# Patient Record
Sex: MALE | Race: AMERICAN INDIAN OR ALASKA NATIVE
[De-identification: names, ages, dates, MRNs, and addresses within clinical notes are randomized per-mention and may not be internally consistent; named-entity substitution may affect disease eponyms.]

---

## 2020-02-20 ENCOUNTER — HOSPITAL ENCOUNTER (OUTPATIENT)
Dept: HOSPITAL 5 - ED | Age: 20
Setting detail: OBSERVATION
LOS: 1 days | Discharge: HOME | End: 2020-02-21
Attending: INTERNAL MEDICINE | Admitting: INTERNAL MEDICINE
Payer: MEDICAID

## 2020-02-20 DIAGNOSIS — Z71.6: ICD-10-CM

## 2020-02-20 DIAGNOSIS — J45.909: ICD-10-CM

## 2020-02-20 DIAGNOSIS — I44.7: Primary | ICD-10-CM

## 2020-02-20 DIAGNOSIS — F17.200: ICD-10-CM

## 2020-02-20 DIAGNOSIS — R07.89: ICD-10-CM

## 2020-02-20 DIAGNOSIS — Z79.899: ICD-10-CM

## 2020-02-20 DIAGNOSIS — Z79.51: ICD-10-CM

## 2020-02-20 LAB
ALBUMIN SERPL-MCNC: 4.7 G/DL (ref 3.9–5)
ALT SERPL-CCNC: 16 UNITS/L (ref 7–56)
APTT BLD: 31.5 SEC. (ref 24.2–36.6)
BENZODIAZEPINES SCREEN,URINE: (no result)
BUN SERPL-MCNC: 10 MG/DL (ref 9–20)
BUN/CREAT SERPL: 10 %
CALCIUM SERPL-MCNC: 9.9 MG/DL (ref 8.4–10.2)
HCT VFR BLD CALC: 40.9 % (ref 35.5–45.6)
HEMOLYSIS INDEX: 7
HGB BLD-MCNC: 14.4 GM/DL (ref 11.8–15.2)
INR PPP: 1.11 (ref 0.87–1.13)
MCHC RBC AUTO-ENTMCNC: 35 % (ref 32–34)
MCV RBC AUTO: 84 FL (ref 84–94)
METHADONE SCREEN,URINE: (no result)
OPIATE SCREEN,URINE: (no result)
PLATELET # BLD: 273 K/MM3 (ref 140–440)
RBC # BLD AUTO: 4.88 M/MM3 (ref 3.65–5.03)

## 2020-02-20 PROCEDURE — 85007 BL SMEAR W/DIFF WBC COUNT: CPT

## 2020-02-20 PROCEDURE — 83880 ASSAY OF NATRIURETIC PEPTIDE: CPT

## 2020-02-20 PROCEDURE — 36415 COLL VENOUS BLD VENIPUNCTURE: CPT

## 2020-02-20 PROCEDURE — 99406 BEHAV CHNG SMOKING 3-10 MIN: CPT

## 2020-02-20 PROCEDURE — 84484 ASSAY OF TROPONIN QUANT: CPT

## 2020-02-20 PROCEDURE — A9502 TC99M TETROFOSMIN: HCPCS

## 2020-02-20 PROCEDURE — 93005 ELECTROCARDIOGRAM TRACING: CPT

## 2020-02-20 PROCEDURE — 85379 FIBRIN DEGRADATION QUANT: CPT

## 2020-02-20 PROCEDURE — 85610 PROTHROMBIN TIME: CPT

## 2020-02-20 PROCEDURE — 85730 THROMBOPLASTIN TIME PARTIAL: CPT

## 2020-02-20 PROCEDURE — G0378 HOSPITAL OBSERVATION PER HR: HCPCS

## 2020-02-20 PROCEDURE — 99291 CRITICAL CARE FIRST HOUR: CPT

## 2020-02-20 PROCEDURE — 71046 X-RAY EXAM CHEST 2 VIEWS: CPT

## 2020-02-20 PROCEDURE — 93017 CV STRESS TEST TRACING ONLY: CPT

## 2020-02-20 PROCEDURE — 80053 COMPREHEN METABOLIC PANEL: CPT

## 2020-02-20 PROCEDURE — 85025 COMPLETE CBC W/AUTO DIFF WBC: CPT

## 2020-02-20 PROCEDURE — 78452 HT MUSCLE IMAGE SPECT MULT: CPT

## 2020-02-20 PROCEDURE — 93306 TTE W/DOPPLER COMPLETE: CPT

## 2020-02-20 PROCEDURE — 80307 DRUG TEST PRSMV CHEM ANLYZR: CPT

## 2020-02-20 PROCEDURE — 80048 BASIC METABOLIC PNL TOTAL CA: CPT

## 2020-02-20 PROCEDURE — 93010 ELECTROCARDIOGRAM REPORT: CPT

## 2020-02-20 NOTE — XRAY REPORT
CHEST 2 VIEWS 



INDICATION / CLINICAL INFORMATION:

cp.



COMPARISON: 

None available.



FINDINGS:



SUPPORT DEVICES: None.

HEART / MEDIASTINUM: No significant abnormality. 

LUNGS / PLEURA: No significant pulmonary or pleural abnormality. .No pneumothorax. 



ADDITIONAL FINDINGS: No significant additional findings.



IMPRESSION:

1. No acute findings.



Signer Name: Frank Hagen MD 

Signed: 2/20/2020 11:16 PM

 Workstation Name: [id number]

## 2020-02-20 NOTE — EMERGENCY DEPARTMENT REPORT
ED Chest Pain HPI





- General


Chief Complaint: Chest Pain


Stated Complaint: CHEST PAIN, COUGH, & LIGHT HEADED


Time Seen by Provider: 02/20/20 22:04


Source: patient


Mode of arrival: Ambulatory


Limitations: No Limitations





- History of Present Illness


Initial Comments: 





Patient is 19 years old male with no significant past medical history.  Patient 

presented to the ER complaining of substernal chest pain, tightness in nature 

associated with shortness of breath.  Patient stated the pain started last 

night.  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 

to breathe some of it.  Patient denied any fever, chills or cough.  Patient de

nied any other symptoms.


MD Complaint: chest pain


-: Last night


Onset: during rest


Pain Location: substernal


Pain Radiation: none


Severity: moderate


Severity scale (0 -10): 4


Quality: tightness


Consistency: intermittent





- Related Data


                                  Previous Rx's











 Medication  Instructions  Recorded  Last Taken  Type


 


Amoxicillin [Amoxicillin TAB] 875 mg PO BID #20 tablet 12/09/14 Unknown Rx


 


Brompheniramine/Pseudoephed/Dm 10 ml PO Q6H PRN #118 ml 12/09/14 Unknown Rx





[Bezumyqqbe-Xtbqllnmwuq-Ox Syr]    


 


Fluticasone [Flonase] 1 spray NS QDAY #1 bottle 12/09/14 Unknown Rx


 


Loratadine/Pseudoephedrine 1 tab PO DAILY #30 tablet 12/09/14 Unknown Rx





[Claritin-D 24Hr]    


 


Nystatin/Lidocaine/Diphenhyd 5 ml MM Q6H PRN #237 ml 12/09/14 Unknown Rx





[First-Bxn Mouthwash]    











                                    Allergies











Allergy/AdvReac Type Severity Reaction Status Date / Time


 


No Known Allergies Allergy   Verified 12/09/14 17:45














Heart Score





- HEART Score


History: Slightly suspicious


EKG: Non-specific


Age: < 45


Risk factors: No known risk factors


Troponin: < normal limit


HEART Score: 1





- Critical Actions


Critical Actions: 0-3 pts:0.9-1.7%risk of adverse cardiac event.Candidate for 

discharge





ED Review of Systems


ROS: 


Stated complaint: CHEST PAIN, COUGH, & LIGHT HEADED


Other details as noted in HPI





Comment: All other systems reviewed and negative


Constitutional: denies: chills, fever


Respiratory: shortness of breath, SOB with exertion.  denies: cough, SOB at rest


Cardiovascular: chest pain.  denies: palpitations


Gastrointestinal: denies: abdominal pain, nausea, vomiting


Musculoskeletal: denies: back pain


Neurological: denies: headache, weakness, numbness, paresthesias, confusion





ED Past Medical Hx





- Past Medical History


Previous Medical History?: Yes


Hx Asthma: Yes





- Surgical History


Past Surgical History?: No





- Social History


Smoking Status: Current Every Day Smoker


Substance Use Type: None





- Medications


Home Medications: 


                                Home Medications











 Medication  Instructions  Recorded  Confirmed  Last Taken  Type


 


Amoxicillin [Amoxicillin TAB] 875 mg PO BID #20 tablet 12/09/14  Unknown Rx


 


Brompheniramine/Pseudoephed/Dm 10 ml PO Q6H PRN #118 ml 12/09/14  Unknown Rx





[Qsvuoygbjg-Hagzgeuljwl-Pa Syr]     


 


Fluticasone [Flonase] 1 spray NS QDAY #1 bottle 12/09/14  Unknown Rx


 


Loratadine/Pseudoephedrine 1 tab PO DAILY #30 tablet 12/09/14  Unknown Rx





[Claritin-D 24Hr]     


 


Nystatin/Lidocaine/Diphenhyd 5 ml MM Q6H PRN #237 ml 12/09/14  Unknown Rx





[First-Bxn Mouthwash]     














ED Physical Exam





- General


Limitations: No Limitations


General appearance: alert, in no apparent distress





- Head


Head exam: Present: atraumatic, normocephalic, normal inspection





- Eye


Eye exam: Present: normal appearance, PERRL





- ENT


ENT exam: Present: normal exam, normal orophraynx, mucous membranes moist





- Neck


Neck exam: Present: normal inspection, full ROM.  Absent: tenderness, 

meningismus





- Respiratory


Respiratory exam: Present: normal lung sounds bilaterally





- Cardiovascular


Cardiovascular Exam: Present: regular rate, normal rhythm, normal heart sounds. 

Absent: irregular rhythm, systolic murmur, diastolic murmur, rubs, gallop





- GI/Abdominal


GI/Abdominal exam: Present: soft, normal bowel sounds.  Absent: distended, 

tenderness, guarding, rebound, rigid, organomegaly, mass, bruit, pulsatile mass,

hernia





- Extremities Exam


Extremities exam: Present: normal inspection, full ROM, normal capillary refill.

 Absent: tenderness, pedal edema, joint swelling, calf tenderness





- Back Exam


Back exam: Present: normal inspection, full ROM.  Absent: CVA tenderness (R), 

CVA tenderness (L), muscle spasm





- Neurological Exam


Neurological exam: Present: alert, oriented X3, CN II-XII intact, normal gait.  

Absent: motor sensory deficit





- Psychiatric


Psychiatric exam: Present: normal mood





- Skin


Skin exam: Present: warm, intact, normal color





ED Course


                                   Vital Signs











  02/20/20 02/20/20 02/20/20





  21:44 22:36 23:17


 


Temperature 98.5 F  


 


Pulse Rate 70 74 74


 


Respiratory 20 21 





Rate   


 


Blood Pressure 151/75  


 


Blood Pressure  135/76 





[Left]   


 


O2 Sat by Pulse 95 100 





Oximetry   














ED Medical Decision Making





- Lab Data


Result diagrams: 


                                 02/20/20 22:27





                                 02/20/20 22:27





- EKG Data


-: EKG Interpreted by Me


EKG shows normal: sinus rhythm


Rate: normal





- EKG Data





02/20/20 22:46


Left bundle branch block.





- Radiology Data


Radiology results: report reviewed





- Medical Decision Making





Patient is 19 years old male with no significant past medical history.  Patient 

presented to the ER complaining of substernal chest pain, tightness in nature 

associated with shortness of breath.  Patient stated the pain started last 

night.  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

o breathe some of it.  Patient denied any fever, chills or cough.  Patient 

denied any other symptoms.





EKG showed a left bundle branch block.  Labs reviewed and is unremarkable 

including a negative troponin and d-dimer.  Chest x-ray is unremarkable.  

Patient continued to have chest pain in the ER, I discussed the patient with Dr. Lira, he advised to admit the patient to the hospital for further work-up.





I discussed the patient with Dr. Jasmine, he agreed to admit the patient to 

medical service for further management.


Critical Care Time: Yes


Critical care time in (mins) excluding proc time.: 30


Critical care attestation.: 


If time is entered above; I have spent that time in minutes in the direct care 

of this critically ill patient, excluding procedure time.








ED Disposition


Clinical Impression: 


 Chest pain, Left bundle branch block





Disposition: DC-09 OP ADMIT IP TO THIS HOSP


Is pt being admited?: Yes


Condition: Stable


Instructions:  Chest Pain (ED)

## 2020-02-20 NOTE — EMERGENCY DEPARTMENT REPORT
Blank Doc





- Documentation


Documentation: 





19-year-old male that presents wth chest pain and SOB.





This initial assessment/diagnostic orders/clinical plan/treatment(s) is/are 

subject to change based on patient's health status, clinical progression and re-

assessment by fellow clinical providers in the ED.  Further treatment and workup

at subsequent clinical providers discretion.  Patient/guardians urged not to 

elope from the ED as their condition may be serious if not clinically assessed 

and managed.  Initial orders include:


1- Patient sent to [facility] for further evaluation and treatment


2- EKG


3- CXR

## 2020-02-21 VITALS — SYSTOLIC BLOOD PRESSURE: 148 MMHG | DIASTOLIC BLOOD PRESSURE: 88 MMHG

## 2020-02-21 LAB
BAND NEUTROPHILS # (MANUAL): 0 K/MM3
BAND NEUTROPHILS # (MANUAL): 0 K/MM3
BUN SERPL-MCNC: 11 MG/DL (ref 9–20)
BUN/CREAT SERPL: 12 %
CALCIUM SERPL-MCNC: 9.8 MG/DL (ref 8.4–10.2)
HCT VFR BLD CALC: 42 % (ref 35.5–45.6)
HEMOLYSIS INDEX: 15
HGB BLD-MCNC: 14.7 GM/DL (ref 11.8–15.2)
MCHC RBC AUTO-ENTMCNC: 35 % (ref 32–34)
MCV RBC AUTO: 84 FL (ref 84–94)
MYELOCYTES # (MANUAL): 0 K/MM3
MYELOCYTES # (MANUAL): 0 K/MM3
PLATELET # BLD: 283 K/MM3 (ref 140–440)
PROMYELOCYTES # (MANUAL): 0 K/MM3
PROMYELOCYTES # (MANUAL): 0 K/MM3
RBC # BLD AUTO: 5 M/MM3 (ref 3.65–5.03)
TOTAL CELLS COUNTED BLD: 100
TOTAL CELLS COUNTED BLD: 100

## 2020-02-21 NOTE — TREADMILL REPORT
NUCLEAR CARDIAC IMAGING REPORT



INDICATION FOR PROCEDURE:  Chest pain.  Informed consent was obtained.



Vasodilator stress was achieved with the intravenous administration of 0.4 mg of

Lexiscan per protocol.  Rest and stress nuclear cardiac imaging was performed

following intravenous administration of technetium-99m Myoview.  Gated SPECT

imaging demonstrates a post-stress left ventricular ejection fraction of 51%

with normal wall motion.  Myocardial perfusion imaging demonstrates no

significant cavity change between stress and rest.  No significant stress

induced perfusion defects are seen.



Nuclear cardiac imaging demonstrates grossly normal post-stress left ventricular

systolic function with no significant evidence of myocardial ischemia or

necrosis.





DD: 02/21/2020 11:02

DT: 02/21/2020 11:08

JOB# [id number]  [id number]

WHITNEY/MORE

## 2020-02-21 NOTE — CONSULTATION
<ABDULAZIZ HERNANDES - Last Filed: 02/21/20 14:28>





History of Present Illness


Consult date: 02/21/20


Requesting physician: MIREILLE PRESTON


Consult reason: chest pain, other (?LBBB)


History of present illness: 


Pt is a 19 y.o. AA male with a past medical hx of asthma and tobacco use. He is 

previously unknown to our practice. Pt presented with c/o squeezing sub-sternal 

CP and SOB x 1 day. He describes his CP and constant and non-radiating. Pt s

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 

his throat is closing up when he is exposed for long periods of time. He was at 

work when he noticed the onset of CP. Upon exam, pt denies SOB, palpitations, 

headache, lightheadedness, and N/V. Trop negative x 2. ECG shows NSR with 

incomplete LBBB and LVH. CXR shows no acute findings. 





Past History


Past Medical History: other (hx of asthma)


Past Surgical History: No surgical history


Social history: smoking (tobacco and marijuana)


Family history: no significant family history





Medications and Allergies


                                    Allergies











Allergy/AdvReac Type Severity Reaction Status Date / Time


 


No Known Allergies Allergy   Verified 12/09/14 17:45











                                Home Medications











 Medication  Instructions  Recorded  Confirmed  Last Taken  Type


 


Aspirin EC [Ecotrin] 325 mg PO QDAY #30 tablet 02/21/20  Unknown Rx











Active Meds: 


Active Medications





Acetaminophen (Tylenol)  650 mg PO Q4H PRN


   PRN Reason: Pain MILD(1-3)/Fever >100.5/HA


Aspirin (Ecotrin)  325 mg PO QDAY ELVIRA


Heparin Sodium (Porcine) (Heparin)  5,000 unit SUB-Q Q8HR ELVIRA


Hydralazine HCl (Apresoline)  10 mg IV Q6H PRN


   PRN Reason: FOR SBP > target


Morphine Sulfate (Morphine)  2 mg IV Q4H PRN


   PRN Reason: Pain, Moderate (4-6)


Nitroglycerin (Nitrostat)  0.4 mg SL Q5M PRN


   PRN Reason: Chest Pain


Ondansetron HCl (Zofran)  4 mg IV Q8H PRN


   PRN Reason: Nausea And Vomiting


Sodium Chloride (Sodium Chloride Flush Syringe 10 Ml)  10 ml IV BID ELVIRA


Sodium Chloride (Sodium Chloride Flush Syringe 10 Ml)  10 ml IV PRN PRN


   PRN Reason: LINE FLUSH











Review of Systems


Constitutional: no weight loss, no weight gain, no fever, no chills, no sweats, 

no fatigue


Ears, nose, mouth and throat: no ear pain, no nose pain, no nasal congestion, no

 mouth pain, no dysphagia


Cardiovascular: chest pain, no orthopnea, no palpitations, no rapid/irregular 

heart beat, no edema, no syncope, no lightheadedness, no shortness of breath, no

 dyspnea on exertion


Respiratory: no cough, no shortness of breath, no dyspnea on exertion, no 

wheezing


Gastrointestinal: no abdominal pain, no nausea, no vomiting, no diarrhea, no 

constipation


Genitourinary Male: no dysuria, no flank pain


Musculoskeletal: no neck stiffness, no neck pain, no muscle weakness, no muscle 

cramps


Integumentary: no rash, no wounds


Neurological: no head injury, no paralysis, no weakness, no parathesias, no 

numbness, no tingling, no seizures, no syncope, no vertigo, no headaches


Endocrine: no cold intolerance, no heat intolerance


Hematologic/Lymphatic: no easy bruising, no easy bleeding


Allergic/Immunologic: no urticaria, no wheezing, no anaphylaxis





Physical Examination


                                Last Vital Signs











Temp  97.9 F   02/21/20 03:30


 


Pulse  75   02/21/20 03:30


 


Resp  16   02/21/20 03:30


 


BP  148/88   02/21/20 10:19


 


Pulse Ox  97   02/21/20 03:30




















General appearance: no acute distress


HEENT: Positive: EOMI, Normocephaly, Mucus Membranes Moist


Neck: Positive: neck supple, trachea midline


Cardiac: Positive: Regular Rate, S1/S2


Lungs: Positive: clear to auscultation, No Wheeze, Rales, Rhonchi


Neuro: Positive: Grossly Intact, Motor Function Intact, Coordination Normal, 

Sensory Function Intact


Abdomen: Positive: Soft, Active Bowel Sounds.  Negative: Tender


Skin: Negative: Rash, Wound


Musculoskeletal: No Pain, Normal Range of Motion


Extremities: Present: upper extr. pulses, lower extr. pulses.  Absent: edema





Results





                                 02/21/20 06:15





                                 02/21/20 06:15


                                 Cardiac Enzymes











  02/20/20 Range/Units





  22:27 


 


AST  21  (5-40)  units/L








                                   Coagulation











  02/20/20 Range/Units





  22:27 


 


PT  14.4  (12.2-14.9)  Sec.


 


INR  1.11  (0.87-1.13)  


 


APTT  31.5  (24.2-36.6)  Sec.








                                       CBC











  02/20/20 02/21/20 Range/Units





  22:27 06:15 


 


WBC  5.0  4.3 L  (4.5-11.0)  K/mm3


 


RBC  4.88  5.00  (3.65-5.03)  M/mm3


 


Hgb  14.4  14.7  (11.8-15.2)  gm/dl


 


Hct  40.9  42.0  (35.5-45.6)  %


 


Plt Count  273  283  (140-440)  K/mm3








                          Comprehensive Metabolic Panel











  02/20/20 02/21/20 Range/Units





  22:27 06:15 


 


Sodium  140  140  (137-145)  mmol/L


 


Potassium  4.4  4.0  (3.6-5.0)  mmol/L


 


Chloride  100.5  102.0  ()  mmol/L


 


Carbon Dioxide  26  25  (22-30)  mmol/L


 


BUN  10  11  (9-20)  mg/dL


 


Creatinine  1.0  0.9  (0.8-1.5)  mg/dL


 


Glucose  89  84  ()  mg/dL


 


Calcium  9.9  9.8  (8.4-10.2)  mg/dL


 


AST  21   (5-40)  units/L


 


ALT  16   (7-56)  units/L


 


Alkaline Phosphatase  77   ()  units/L


 


Total Protein  7.3   (6.3-8.2)  g/dL


 


Albumin  4.7   (3.9-5)  g/dL














- Imaging and Cardiology


Echo: report reviewed (EF 55-60%; no additional significant findings)


EKG: report reviewed, image reviewed





- EKG Interpretation


EKG: no acute changes





EKG interpretations





- Telemetry


EKG Rhythm: Salazar-Parkinson-White


AV and intraventricular conduction: left bundle branch block


Chamber hypertrophy or enlargement: left ventricular hypertro


Additional Comments: 


WPW








Assessment and Plan


Cardiology was consulted for substernal squeezing CP and suspected LBBB on ECG. 

Cardiac CTA would have been preferred in the setting of ECG suspicious for WPW; 

however, given that camera is down, the decision was made to proceed with 

Lexiscan MPI stress test. 


Lexiscan MPI stress test demonstrates grossly normal post-stress LV systolic 

function with no significant evidence of myocardial ischemia or necrosis.


Echo shows EF 55-60%; no additional significant findings. 


Suspect WPW syndrome. Will plan for outpt EP eval.   


Currently stable cardiac status.


F/u in our [place] office with Dr. Tyler - 3/4/2020 @ 10:30a


F/u in our [place] office for EP consult with Dr. Oliveira - 3/6/2020 @ 

9:30a





The patient has been seen in conjunction with Dr. Tyler, who agrees with the 

assessment and plan of care. 





- Patient Problems


(1) WPW (Ernesto-Parkinson-White syndrome)


Status: Suspected   





(2) Chest pain


Status: Resolved   





(3) History of asthma


Status: Chronic   





(4) Tobacco use


Status: Chronic   





<MAULIK TYLER R - Last Filed: 02/22/20 11:17>





History of Present Illness


Consult reason: other


History of present illness: 


ecg demonstrates sinus rhythm with wpw type B pattern











Physical Examination


                                   Vital Signs











Temp Pulse Resp BP Pulse Ox


 


 98.5 F   70   20   151/75   95 


 


 02/20/20 21:44  02/20/20 21:44  02/20/20 21:44  02/20/20 21:44  02/20/20 21:44














Results





                                 02/21/20 06:15





                                 02/21/20 06:15

## 2020-02-21 NOTE — DISCHARGE SUMMARY
Providers





- Providers


Date of Admission: 


02/21/20 00:09





Attending physician: 


JOEL VILLATORO MD





                                        





02/21/20


Consult to Cardiac Rehabilitation [CONS] Routine 


   Reason For Exam: Phase I





02/21/20 00:02


Consult to Physician [CONS] Stat 


   Comment: Dr. Varma spoke with Dr. Pierre @ 0000


   Consulting Provider: SHAVON PIERRE


   Physician Instructions: 


   Reason For Exam: chest pain with LBBB











Primary care physician: 


PRIMARY CARE MD








Hospitalization


Reason for admission: Chest pain


Condition: Stable


Hospital course: 


19-year-old -American male with no significant past medical history 

presenting to the emergency room today complaining of chest pain.  Chest pain is

 said to be substernal and felt like tightness with associated shortness of 

breath.  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 

cleaning .  He denies any fever or chills, no nausea vomiting, no headache or 

dizziness.  There is no radiation of the chest pain and no no relieving or 

exacerbating factors.  Patient denies having this type of symptoms in the past. 

 Denies any family history of heart disease.





Upon arrival in the emergency room his work-up was significant for left bundle 

branch block on the EKG.  All other work-up were essentially within normal 

limits.  Patient's condition was discussed with the cardiologist on-call by the 

ER physician and recommendation was to have patient admitted for further work-up

 including echocardiogram.


EKG was noted to have left bundle branch block patient was seen by cardiology 

there was concern on EKG about preexcitation pattern associated with ST-T 

repolarization abnormality concerning for WPW  patient was recommended to have a

 cardiac CTA for further evaluation of chest pain but unfortunately the cardiac 

CT scan was not operational and so a stress test was done which was read and 

without ischemia..  An EP was recommended as outpatient to further evaluate the 

patient there was no evidence of palpation or syncope reported by the patient.








18 yo male for whom card consult requested for eval cp described as midsternal 

squeezing in quality assoc with sob


ecg: sinus jude 55 bpm with short pr, wide qrs, delta wave consistent with pre-

excitation pattern with assoc stt repol abn


echo unremarkable


considered cardiac cta for further eval of cp but was told ct scanner was non 

operational. vasodilator stress test ordered


rec ep consult as outpt. pt has had no palp or syncope








1) atypical chest pain likely costochondritis


2) WPW cardiac abnormality presumed


3) Left bundle branch block





Disposition: DC-01 TO HOME OR SELFCARE


Time spent for discharge: 35 mins





Core Measure Documentation





- Palliative Care


Palliative Care/ Comfort Measures: Not Applicable





- Core Measures


Any of the following diagnoses?: none





Exam





- Physical Exam


Narrative exam: 


VITAL SIGNS:  Reviewed.    


GENERAL:  The patient appears normally developed, Vital signs as documented.


HEAD:  No signs of head trauma.


EYES:  Pupils are equal.  Extraocular motions intact.  


EARS:  Hearing grossly intact.


MOUTH:  Oropharynx is normal. 


NECK:  No adenopathy, no JVD.  


CHEST:  Chest with clear breath sounds bilaterally.  No wheezes, rales, or 

rhonchi.  


CARDIAC:  Regular rate and rhythm.  S1 and S2, without murmurs, gallops, or 

rubs.


VASCULAR:  No Edema.  Peripheral pulses normal and equal in all extremities.


ABDOMEN:  Soft, non tender and non distended.  No   rebound or guarding, and no 

masses palpated.   Bowel Sounds normal.


MUSCULOSKELETAL:  Good range of motion of all major joints. Extremities without 

clubbing, cyanosis or edema.  


NEUROLOGIC EXAM:  Alert and oriented x 3   No focal sensory or strength 

deficits.   Speech normal.  Follows commands.


PSYCHIATRIC:  Mood normal.


SKIN:  detial exam as documented in skin assessment








- Constitutional


Vitals: 


                                        











Temp Pulse Resp BP Pulse Ox


 


 97.9 F   75   16   148/88   97 


 


 02/21/20 03:30  02/21/20 03:30  02/21/20 03:30  02/21/20 10:19  02/21/20 03:30














Plan


Activity: advance as tolerated, fall precautions


Diet: low fat


Special Instructions: record daily BP diary


Follow up with: 


MAULIK TYLER MD [Staff Physician] - 7 Days (F/u in our [place] office with Dr. Tyler - 3/4/2020 @ 10:30a)


MERCEDES ROMERO MD [Staff Physician] - 7 Days (F/u in our [place] 

office for EP consult with Dr. Romero - 3/6/2020 @ 9:30a)


PRIMARY CARE,MD [Primary Care Provider] - 3-5 Days


Forms:  Work/School Release Form


Prescriptions: 


Aspirin EC [Ecotrin] 325 mg PO QDAY #30 tablet

## 2020-02-21 NOTE — EVENT NOTE
Date: 02/21/20





20 yo male for whom card consult requested for eval cp described as midsternal 

squeezing in quality assoc with sob


ecg: sinus jude 55 bpm with short pr, wide qrs, delta wave consistent with pre-

excitation pattern with assoc stt repol abn


echo unremarkable


considered cardiac cta for further eval of cp but was told ct scanner was non 

operational. vasodilator stress test ordered


rec ep consult as outpt. pt has had no palp or syncope

## 2020-02-21 NOTE — HISTORY AND PHYSICAL REPORT
History of Present Illness


Date of examination: 02/21/20


Date of admission: 


02/21/20 00:09





Chief complaint: 





Chest Pain


History of present illness: 





19-year-old -American male with no significant past medical history 

presenting to the emergency room today complaining of chest pain.  Chest pain is

said to be substernal and felt like tightness with associated shortness of 

breath.  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 

cleaning .  He denies any fever or chills, no nausea vomiting, no headache or 

dizziness.  There is no radiation of the chest pain and no no relieving or 

exacerbating factors.  Patient denies having this type of symptoms in the past. 

Denies any family history of heart disease.





Upon arrival in the emergency room his work-up was significant for left bundle 

branch block on the EKG.  All other work-up were essentially within normal 

limits.  Patient's condition was discussed with the cardiologist on-call by the 

ER physician and recommendation was to have patient admitted for further work-up

including echocardiogram.





Past History


Past Medical History: other (H/O Asthma)


Past Surgical History: No surgical history


Social history: no significant social history


Family history: no significant family history





Medications and Allergies


                                    Allergies











Allergy/AdvReac Type Severity Reaction Status Date / Time


 


No Known Allergies Allergy   Verified 12/09/14 17:45











                                Home Medications











 Medication  Instructions  Recorded  Confirmed  Last Taken  Type


 


Amoxicillin [Amoxicillin TAB] 875 mg PO BID #20 tablet 12/09/14  Unknown Rx


 


Brompheniramine/Pseudoephed/Dm 10 ml PO Q6H PRN #118 ml 12/09/14  Unknown Rx





[Wikqrlnlsg-Fdijhvoakad-Ia Syr]     


 


Fluticasone [Flonase] 1 spray NS QDAY #1 bottle 12/09/14  Unknown Rx


 


Loratadine/Pseudoephedrine 1 tab PO DAILY #30 tablet 12/09/14  Unknown Rx





[Claritin-D 24Hr]     


 


Nystatin/Lidocaine/Diphenhyd 5 ml MM Q6H PRN #237 ml 12/09/14  Unknown Rx





[First-Bxn Mouthwash]     











Active Meds: 


Active Medications





Acetaminophen (Tylenol)  650 mg PO Q4H PRN


   PRN Reason: Pain MILD(1-3)/Fever >100.5/HA


Aspirin (Ecotrin)  325 mg PO QDAY ELVIRA


Hydralazine HCl (Apresoline)  10 mg IV Q6HR PRN


   PRN Reason: FOR SBP > target


Morphine Sulfate (Morphine)  2 mg IV Q4H PRN


   PRN Reason: Pain, Moderate (4-6)


Nitroglycerin (Nitrostat)  0.4 mg SL Q5M PRN


   PRN Reason: Chest Pain


Ondansetron HCl (Zofran)  4 mg IV Q8H PRN


   PRN Reason: Nausea And Vomiting


Sodium Chloride (Sodium Chloride Flush Syringe 10 Ml)  10 ml IV PRN PRN


   PRN Reason: LINE FLUSH


Sodium Chloride (Sodium Chloride Flush Syringe 10 Ml)  10 ml IV BID ELVIRA


Sodium Chloride (Sodium Chloride Flush Syringe 10 Ml)  10 ml IV PRN PRN


   PRN Reason: LINE FLUSH











Review of Systems


Constitutional: no fever, no chills


Cardiovascular: chest pain, no palpitations


Respiratory: no cough, no shortness of breath


Gastrointestinal: no nausea, no vomiting, no diarrhea


Genitourinary Male: no dysuria, no hematuria


Musculoskeletal: no neck pain, no low back pain


Integumentary: no rash, no pruritis


Neurological: no headaches, no change in mentation





Exam





- Constitutional


Vitals: 


                                        











Temp Pulse Resp BP Pulse Ox


 


 97.8 F   57 L  18   145/72   96 


 


 02/21/20 01:12  02/21/20 01:12  02/21/20 01:12  02/21/20 01:12  02/21/20 01:12











General appearance: Present: no acute distress, well-nourished





- EENT


Eyes: Present: PERRL, EOM intact


ENT: hearing intact, clear oral mucosa, dentition normal





- Neck


Neck: Present: supple, normal ROM





- Respiratory


Respiratory effort: normal


Respiratory: bilateral: CTA





- Cardiovascular


Rhythm: regular


Heart Sounds: Present: S1 & S2





- Extremities


Extremities: no ischemia, pulses intact, pulses symmetrical, No edema, Full ROM


Peripheral Pulses: within normal limits





- Abdominal


General gastrointestinal: Present: soft, non-tender, non-distended





- Integumentary


Integumentary: Present: clear, warm, dry





- Musculoskeletal


Musculoskeletal: strength equal bilaterally





- Psychiatric


Psychiatric: appropriate mood/affect, intact judgment & insight, cooperative





- Neurologic


Neurologic: CNII-XII intact, moves all extremities





Results





- Labs


CBC & Chem 7: 


                                 02/20/20 22:27





                                 02/20/20 22:27


Labs: 


                              Abnormal lab results











  02/20/20 Range/Units





  22:27 


 


MCHC  35 H  (32-34)  %


 


Seg Neuts % (Manual)  28.0 L  (40.0-70.0)  %


 


Lymphocytes % (Manual)  55.0 H  (13.4-35.0)  %


 


Monocytes % (Manual)  12.0 H  (0.0-7.3)  %


 


Eosinophils % (Manual)  5.0 H  (0.0-4.3)  %


 


Seg Neutrophils # Man  1.4 L  (1.8-7.7)  K/mm3














Assessment and Plan





- Patient Problems


(1) Chest pain


Current Visit: Yes   Status: Acute   


Plan to address problem: 


Patient admitted and placed on telemetry.  Will monitor cardiac enzymes.  

Patient placed on daily aspirin.


Patient will be scheduled for echocardiogram.  We will await further 

recommendation from cardiology.








(2) Left bundle branch block


Current Visit: Yes   Status: Acute   


Plan to address problem: 


Monitor EKG and will await recommendation from cardiology.  Will monitor EKG.








(3) DVT prophylaxis


Current Visit: Yes   Status: Acute   


Plan to address problem: 


Patient placed on subcutaneous heparin.








(4) Full code status


Current Visit: Yes   Status: Acute